# Patient Record
Sex: FEMALE | URBAN - METROPOLITAN AREA
[De-identification: names, ages, dates, MRNs, and addresses within clinical notes are randomized per-mention and may not be internally consistent; named-entity substitution may affect disease eponyms.]

---

## 2019-10-07 ENCOUNTER — IMPORTED ENCOUNTER (OUTPATIENT)
Dept: URBAN - METROPOLITAN AREA CLINIC 9 | Facility: CLINIC | Age: 69
End: 2019-10-07

## 2019-12-11 ENCOUNTER — IMPORTED ENCOUNTER (OUTPATIENT)
Dept: URBAN - METROPOLITAN AREA CLINIC 9 | Facility: CLINIC | Age: 69
End: 2019-12-11

## 2020-01-14 ENCOUNTER — IMPORTED ENCOUNTER (OUTPATIENT)
Dept: URBAN - METROPOLITAN AREA CLINIC 9 | Facility: CLINIC | Age: 70
End: 2020-01-14

## 2021-03-04 NOTE — PATIENT DISCUSSION
"""Computer Vision Syndrome OU.  Advised patient to start using artificial tears four times a day to ""

## 2021-03-04 NOTE — PATIENT DISCUSSION
"""Patient elects to change glasses. Rx given. Discussed with patient about contact lenses.  Will ""

## 2021-03-19 ENCOUNTER — IMPORTED ENCOUNTER (OUTPATIENT)
Dept: URBAN - METROPOLITAN AREA CLINIC 9 | Facility: CLINIC | Age: 71
End: 2021-03-19

## 2021-03-19 PROBLEM — H01.02A: Noted: 2021-03-19

## 2021-03-19 PROBLEM — H01.02B: Noted: 2021-03-19

## 2021-10-16 ASSESSMENT — TONOMETRY
OD_IOP_MMHG: 13
OD_IOP_MMHG: 17
OD_IOP_MMHG: 16
OS_IOP_MMHG: 18
OS_IOP_MMHG: 19
OS_IOP_MMHG: 18

## 2021-10-16 ASSESSMENT — KERATOMETRY
OS_K1POWER_DIOPTERS: 45
OS_AXISANGLE_DEGREES: 86
OD_K2POWER_DIOPTERS: 45.5
OD_K1POWER_DIOPTERS: 45
OD_AXISANGLE_DEGREES: 153
OS_K2POWER_DIOPTERS: 45.5
OS_AXISANGLE2_DEGREES: 176
OD_AXISANGLE2_DEGREES: 63

## 2021-10-16 ASSESSMENT — VISUAL ACUITY
OS_CC: 20/25 SN
OS_CC: 20/25 SN
OD_CC: 20/30 -2 SN
OS_CC: 20/30 - SN
OD_SC: 20/200 SN
OD_CC: 20/30 SN
OD_CC: 20/30 SN
OD_CC: 20/25 SN
OD_CC: 20/25 SN
OS_SC: 20/100 SN

## 2022-07-01 RX ORDER — LANSOPRAZOLE 30 MG/1
1 CAPSULE, DELAYED RELEASE ORAL
COMMUNITY

## 2022-07-01 RX ORDER — LOSARTAN POTASSIUM 50 MG/1
TABLET ORAL
COMMUNITY

## 2022-07-01 RX ORDER — MESALAMINE 1.2 G/1
TABLET, DELAYED RELEASE ORAL
COMMUNITY

## 2022-07-01 RX ORDER — IBUPROFEN 800 MG/1
TABLET ORAL
COMMUNITY
End: 2022-09-06

## 2022-07-01 RX ORDER — LEVOTHYROXINE SODIUM 0.07 MG/1
TABLET ORAL
COMMUNITY

## 2022-07-01 RX ORDER — FLUTICASONE PROPIONATE 50 MCG
SPRAY, SUSPENSION (ML) NASAL
COMMUNITY
End: 2022-07-16

## 2022-07-01 RX ORDER — BENZONATATE 200 MG/1
1 CAPSULE ORAL
COMMUNITY
Start: 2021-07-26 | End: 2022-07-16

## 2022-07-16 PROBLEM — M54.12 CERVICAL RADICULOPATHY: Status: ACTIVE | Noted: 2022-07-16

## 2022-07-16 PROBLEM — M51.36 DDD (DEGENERATIVE DISC DISEASE), LUMBAR: Status: ACTIVE | Noted: 2022-07-16

## 2022-07-16 PROBLEM — J30.2 SEASONAL ALLERGIC RHINITIS: Status: ACTIVE | Noted: 2022-07-16

## 2022-07-16 PROBLEM — K50.80 CROHN'S DISEASE OF BOTH SMALL AND LARGE INTESTINE (HCC): Status: ACTIVE | Noted: 2022-07-16

## 2022-07-16 PROBLEM — K63.5 POLYP OF COLON: Status: ACTIVE | Noted: 2022-07-16

## 2022-07-16 PROBLEM — E78.5 HYPERLIPIDEMIA: Status: ACTIVE | Noted: 2022-07-16

## 2022-07-16 PROBLEM — E03.9 ACQUIRED HYPOTHYROIDISM: Status: ACTIVE | Noted: 2022-07-16

## 2022-07-16 PROBLEM — M47.816 LUMBAR SPONDYLOSIS: Status: ACTIVE | Noted: 2022-07-16

## 2022-07-16 PROBLEM — E55.9 VITAMIN D DEFICIENCY: Status: ACTIVE | Noted: 2022-07-16

## 2022-08-16 PROBLEM — M15.9 GENERALIZED OA: Status: ACTIVE | Noted: 2018-07-23

## 2022-08-16 PROBLEM — Z79.899 LONG TERM USE OF DRUG: Status: ACTIVE | Noted: 2022-08-16

## 2022-08-16 PROBLEM — I10 ESSENTIAL HYPERTENSION: Status: ACTIVE | Noted: 2021-09-22

## 2022-09-06 PROBLEM — E03.9 ACQUIRED HYPOTHYROIDISM: Status: ACTIVE | Noted: 2018-07-23

## 2023-04-17 ENCOUNTER — ESTABLISHED PATIENT (OUTPATIENT)
Dept: URBAN - METROPOLITAN AREA CLINIC 16 | Facility: CLINIC | Age: 73
End: 2023-04-17

## 2023-04-17 DIAGNOSIS — H40.013: ICD-10-CM

## 2023-04-17 DIAGNOSIS — H01.02A: ICD-10-CM

## 2023-04-17 DIAGNOSIS — H01.02B: ICD-10-CM

## 2023-04-17 DIAGNOSIS — H25.13: ICD-10-CM

## 2023-04-17 PROCEDURE — 92133 CPTRZD OPH DX IMG PST SGM ON: CPT

## 2023-04-17 PROCEDURE — 92014 COMPRE OPH EXAM EST PT 1/>: CPT

## 2023-04-17 PROCEDURE — 92015 DETERMINE REFRACTIVE STATE: CPT

## 2023-04-17 ASSESSMENT — VISUAL ACUITY
OS_CC: 20/25
OS_BCVA: 20/25
OD_BCVA: 20/25
OD_CC: 20/30

## 2023-04-17 ASSESSMENT — TONOMETRY
OS_IOP_MMHG: 18
OD_IOP_MMHG: 17

## 2023-05-03 ENCOUNTER — ESTABLISHED PATIENT (OUTPATIENT)
Dept: URBAN - METROPOLITAN AREA CLINIC 16 | Facility: CLINIC | Age: 73
End: 2023-05-03

## 2023-05-03 DIAGNOSIS — H01.02A: ICD-10-CM

## 2023-05-03 DIAGNOSIS — H01.02B: ICD-10-CM

## 2023-05-03 DIAGNOSIS — H40.013: ICD-10-CM

## 2023-05-03 PROCEDURE — 92012 INTRM OPH EXAM EST PATIENT: CPT

## 2023-05-03 PROCEDURE — 92083 EXTENDED VISUAL FIELD XM: CPT

## 2023-05-03 ASSESSMENT — TONOMETRY
OS_IOP_MMHG: 19
OD_IOP_MMHG: 18

## 2023-05-03 ASSESSMENT — VISUAL ACUITY
OD_CC: 20/25
OU_CC: 20/25
OS_CC: 20/25-1

## 2024-04-19 ENCOUNTER — ESTABLISHED PATIENT (OUTPATIENT)
Dept: URBAN - METROPOLITAN AREA CLINIC 16 | Facility: CLINIC | Age: 74
End: 2024-04-19

## 2024-04-19 DIAGNOSIS — H01.02A: ICD-10-CM

## 2024-04-19 DIAGNOSIS — H02.89: ICD-10-CM

## 2024-04-19 DIAGNOSIS — H01.02B: ICD-10-CM

## 2024-04-19 DIAGNOSIS — H40.013: ICD-10-CM

## 2024-04-19 DIAGNOSIS — H25.13: ICD-10-CM

## 2024-04-19 PROCEDURE — 92014 COMPRE OPH EXAM EST PT 1/>: CPT

## 2024-04-19 PROCEDURE — 92015 DETERMINE REFRACTIVE STATE: CPT

## 2024-04-19 PROCEDURE — 92133 CPTRZD OPH DX IMG PST SGM ON: CPT

## 2024-04-19 ASSESSMENT — VISUAL ACUITY
OS_CC: 20/25
OD_CC: 20/20

## 2024-04-19 ASSESSMENT — TONOMETRY
OD_IOP_MMHG: 20
OS_IOP_MMHG: 20

## 2024-04-23 ENCOUNTER — DIAGNOSTICS ONLY (OUTPATIENT)
Dept: URBAN - METROPOLITAN AREA CLINIC 16 | Facility: CLINIC | Age: 74
End: 2024-04-23

## 2024-04-23 ASSESSMENT — TONOMETRY
OD_IOP_MMHG: 20
OS_IOP_MMHG: 18